# Patient Record
Sex: FEMALE | Race: ASIAN | Employment: FULL TIME | URBAN - METROPOLITAN AREA
[De-identification: names, ages, dates, MRNs, and addresses within clinical notes are randomized per-mention and may not be internally consistent; named-entity substitution may affect disease eponyms.]

---

## 2020-10-26 DIAGNOSIS — E55.9 VITAMIN D DEFICIENCY: ICD-10-CM

## 2020-10-26 DIAGNOSIS — Z00.00 PREVENTATIVE HEALTH CARE: ICD-10-CM

## 2020-10-26 DIAGNOSIS — R73.03 PREDIABETES: ICD-10-CM

## 2020-10-26 LAB
A/G RATIO: 2.3 (ref 1.1–2.2)
ALBUMIN SERPL-MCNC: 4.6 G/DL (ref 3.4–5)
ALP BLD-CCNC: 102 U/L (ref 40–129)
ALT SERPL-CCNC: 39 U/L (ref 10–40)
ANION GAP SERPL CALCULATED.3IONS-SCNC: 14 MMOL/L (ref 3–16)
AST SERPL-CCNC: 29 U/L (ref 15–37)
BASOPHILS ABSOLUTE: 0.1 K/UL (ref 0–0.2)
BASOPHILS RELATIVE PERCENT: 1 %
BILIRUB SERPL-MCNC: 0.4 MG/DL (ref 0–1)
BUN BLDV-MCNC: 9 MG/DL (ref 7–20)
CALCIUM SERPL-MCNC: 9.5 MG/DL (ref 8.3–10.6)
CHLORIDE BLD-SCNC: 104 MMOL/L (ref 99–110)
CHOLESTEROL, FASTING: 142 MG/DL (ref 0–199)
CO2: 28 MMOL/L (ref 21–32)
CREAT SERPL-MCNC: 0.6 MG/DL (ref 0.6–1.2)
EOSINOPHILS ABSOLUTE: 0.1 K/UL (ref 0–0.6)
EOSINOPHILS RELATIVE PERCENT: 2.1 %
GFR AFRICAN AMERICAN: >60
GFR NON-AFRICAN AMERICAN: >60
GLOBULIN: 2 G/DL
GLUCOSE FASTING: 129 MG/DL (ref 70–99)
HCT VFR BLD CALC: 40.2 % (ref 36–48)
HDLC SERPL-MCNC: 51 MG/DL (ref 40–60)
HEMOGLOBIN: 13.5 G/DL (ref 12–16)
LDL CHOLESTEROL CALCULATED: 46 MG/DL
LYMPHOCYTES ABSOLUTE: 1.9 K/UL (ref 1–5.1)
LYMPHOCYTES RELATIVE PERCENT: 32.5 %
MCH RBC QN AUTO: 30.1 PG (ref 26–34)
MCHC RBC AUTO-ENTMCNC: 33.6 G/DL (ref 31–36)
MCV RBC AUTO: 89.4 FL (ref 80–100)
MONOCYTES ABSOLUTE: 0.4 K/UL (ref 0–1.3)
MONOCYTES RELATIVE PERCENT: 6.7 %
NEUTROPHILS ABSOLUTE: 3.3 K/UL (ref 1.7–7.7)
NEUTROPHILS RELATIVE PERCENT: 57.7 %
PDW BLD-RTO: 13.7 % (ref 12.4–15.4)
PLATELET # BLD: 256 K/UL (ref 135–450)
PMV BLD AUTO: 9.2 FL (ref 5–10.5)
POTASSIUM SERPL-SCNC: 4 MMOL/L (ref 3.5–5.1)
RBC # BLD: 4.49 M/UL (ref 4–5.2)
SODIUM BLD-SCNC: 146 MMOL/L (ref 136–145)
T4 FREE: 1.1 NG/DL (ref 0.9–1.8)
TOTAL PROTEIN: 6.6 G/DL (ref 6.4–8.2)
TRIGLYCERIDE, FASTING: 226 MG/DL (ref 0–150)
TSH SERPL DL<=0.05 MIU/L-ACNC: 1.93 UIU/ML (ref 0.27–4.2)
VITAMIN D 25-HYDROXY: 34 NG/ML
VLDLC SERPL CALC-MCNC: 45 MG/DL
WBC # BLD: 5.7 K/UL (ref 4–11)

## 2020-10-27 LAB
ESTIMATED AVERAGE GLUCOSE: 131.2 MG/DL
HBA1C MFR BLD: 6.2 %

## 2022-02-23 PROBLEM — M51.369 DDD (DEGENERATIVE DISC DISEASE), LUMBAR: Status: ACTIVE | Noted: 2019-11-03

## 2023-11-01 ENCOUNTER — APPOINTMENT (OUTPATIENT)
Dept: PHYSICAL THERAPY | Age: 63
End: 2023-11-01
Payer: COMMERCIAL

## 2023-11-03 ENCOUNTER — TELEPHONE (OUTPATIENT)
Dept: ORTHOPEDIC SURGERY | Age: 63
End: 2023-11-03

## 2023-11-03 NOTE — TELEPHONE ENCOUNTER
Auth: NPR  Date: 11/14/23  Type of SX: OUTPATIENT  Location: [facility]  CPT: 35348   DX: M16.11  SX area: RIGHT HIP  Insurance: BC BS/BSMH

## 2023-11-13 ENCOUNTER — APPOINTMENT (OUTPATIENT)
Dept: PHYSICAL THERAPY | Age: 63
End: 2023-11-13
Payer: COMMERCIAL

## 2023-11-15 ENCOUNTER — APPOINTMENT (OUTPATIENT)
Dept: PHYSICAL THERAPY | Age: 63
End: 2023-11-15
Payer: COMMERCIAL

## 2023-11-17 ENCOUNTER — TELEPHONE (OUTPATIENT)
Dept: ORTHOPEDIC SURGERY | Age: 63
End: 2023-11-17

## 2023-11-17 DIAGNOSIS — M16.11 OSTEOARTHRITIS OF RIGHT HIP, UNSPECIFIED OSTEOARTHRITIS TYPE: ICD-10-CM

## 2023-11-17 RX ORDER — OXYCODONE HYDROCHLORIDE 5 MG/1
5-10 TABLET ORAL EVERY 6 HOURS PRN
Qty: 80 TABLET | Refills: 0 | Status: SHIPPED | OUTPATIENT
Start: 2023-11-17 | End: 2023-12-04 | Stop reason: SDUPTHER

## 2023-11-17 NOTE — TELEPHONE ENCOUNTER
Prescription Refill     Medication Name:  OXYCODONE  REQUESTING A CALL BACK  Pharmacy: [facility]  Patient Contact Number:  [phone]

## 2023-11-27 ENCOUNTER — APPOINTMENT (OUTPATIENT)
Dept: PHYSICAL THERAPY | Age: 63
End: 2023-11-27
Attending: ORTHOPAEDIC SURGERY
Payer: COMMERCIAL

## 2023-11-27 ENCOUNTER — OFFICE VISIT (OUTPATIENT)
Dept: ORTHOPEDIC SURGERY | Age: 63
End: 2023-11-27

## 2023-11-27 VITALS — BODY MASS INDEX: 31.32 KG/M2 | HEIGHT: 65 IN | WEIGHT: 188 LBS | RESPIRATION RATE: 16 BRPM

## 2023-11-27 DIAGNOSIS — Z96.641 STATUS POST TOTAL REPLACEMENT OF RIGHT HIP: Primary | ICD-10-CM

## 2023-11-27 PROCEDURE — 99024 POSTOP FOLLOW-UP VISIT: CPT | Performed by: ORTHOPAEDIC SURGERY

## 2023-11-27 NOTE — PROGRESS NOTES
[address] [facility]  Office Visit    Chief Complaint: Follow-up s/p right total hip arthroplasty    HPI:  James Lamar is a 61 y.o. who is here in follow-up of right total hip arthroplasty performed on November 14, 2023. She is walking with a walker and active in home physical therapy. She is taking oxycodone every 6 hours to help with pain. She rates pain as up to 5/10. She has pain down her lateral thigh with occasional sharp pains in this area. Patient Active Problem List   Diagnosis    Perimenopausal    Impaired fasting glucose    Anxiety    History of total hip arthroplasty, left    OA (osteoarthritis) of hip    Essential hypertension    Mixed hyperlipidemia    Arthritis of right knee    Obesity (BMI 30-39. 9)    Anxiety with depression    Trigger finger of left thumb    Acute right-sided low back pain with sciatica    Facet arthritis of lumbosacral region    Spinal stenosis of lumbar region with neurogenic claudication    Primary insomnia    Strain of right shoulder    Calculus of gallbladder with chronic cholecystitis without obstruction    DDD (degenerative disc disease), lumbar    Osteoarthritis of right hip, unspecified osteoarthritis type       ROS:  Constitutional: denies fever, chills, weight loss  MSK: denies pain in other joints, muscle aches  Neurological: denies numbness, tingling, weakness    Exam:  Appearance: sitting in exam room chair, appears to be in no acute distress, awake and alert  Resp: unlabored breathing on room air  Skin: warm, dry and intact with out erythema or significant increased temperature  Neuro: grossly intact both lower extremities. Intact sensation to light touch. Motor exam 4+ to 5/5 in all major motor groups. Right hip: Incision is healing as expected. Examination demonstrates negative logroll and negative Stinchfield. There is brisk capillary refill. She demonstrates weak active hip flexion.     Imaging:  3 views of the right hip were

## 2023-11-29 ENCOUNTER — APPOINTMENT (OUTPATIENT)
Dept: PHYSICAL THERAPY | Age: 63
End: 2023-11-29
Attending: ORTHOPAEDIC SURGERY
Payer: COMMERCIAL

## 2023-11-30 ENCOUNTER — CARE COORDINATION (OUTPATIENT)
Dept: OTHER | Facility: CLINIC | Age: 63
End: 2023-11-30

## 2023-11-30 NOTE — CARE COORDINATION
Care Transitions Follow Up Call    [facility] attempted to reach patient for Care Transitions follow up call. HIPAA compliant message left requesting a return phone call at patient convenience.      Plan for follow-up call in 10-14 days    Future Appointments   Date Time Provider [address]   12/5/2023 12:40 PM Lucho American, [place] [facility] OP PT [facility]   12/7/2023 10:40 AM Lucho American, PT [facility] OP PT [facility]   12/12/2023  9:20 AM Lucho American, PT [facility] OP PT [facility]   12/14/2023 10:40 AM Lucho American, PT [facility] OP PT [facility]   12/19/2023  8:40 AM Lucho American, PT [facility] OP PT [facility]   12/21/2023  8:40 AM Lucho American, PT [facility] OP PT [facility]   12/27/2023 10:00 AM Diego Baeza, PTA [facility] OP PT [facility]   12/29/2023  8:40 AM Diego Baeza, PTA [facility] OP PT [facility]   1/2/2024 10:00 AM Lucho American, PT [facility] PT [facility]   1/4/2024 10:15 AM Antonio Mcneil MD [facility]   5/16/2024  8:30 AM EDGAR Laird - CNP [facility]

## 2023-12-04 DIAGNOSIS — M16.11 OSTEOARTHRITIS OF RIGHT HIP, UNSPECIFIED OSTEOARTHRITIS TYPE: ICD-10-CM

## 2023-12-04 RX ORDER — OXYCODONE HYDROCHLORIDE 5 MG/1
5-10 TABLET ORAL EVERY 6 HOURS PRN
Qty: 80 TABLET | Refills: 0 | Status: SHIPPED | OUTPATIENT
Start: 2023-12-04 | End: 2023-12-14

## 2023-12-04 NOTE — TELEPHONE ENCOUNTER
Prescription Refill     Medication Name:  OXYCODONE  Pharmacy: [id number] [place] [address]   Patient Contact Number:  [phone]

## 2023-12-05 ENCOUNTER — HOSPITAL ENCOUNTER (OUTPATIENT)
Dept: PHYSICAL THERAPY | Age: 63
Setting detail: THERAPIES SERIES
Discharge: HOME | End: 2023-12-05
Attending: ORTHOPAEDIC SURGERY
Payer: COMMERCIAL

## 2023-12-05 DIAGNOSIS — M25.651 DECREASED RANGE OF RIGHT HIP MOVEMENT: ICD-10-CM

## 2023-12-05 DIAGNOSIS — M25.551 ACUTE PAIN OF RIGHT HIP: Primary | ICD-10-CM

## 2023-12-05 DIAGNOSIS — M62.81 DECREASED MUSCLE STRENGTH: ICD-10-CM

## 2023-12-05 PROCEDURE — 97530 THERAPEUTIC ACTIVITIES: CPT

## 2023-12-05 PROCEDURE — 97110 THERAPEUTIC EXERCISES: CPT

## 2023-12-05 PROCEDURE — 97161 PT EVAL LOW COMPLEX 20 MIN: CPT

## 2023-12-05 NOTE — FLOWSHEET NOTE
[address] [facility], [address] office: [phone] fax: [phone]      Physical Therapy: TREATMENT/PROGRESS NOTE   Patient: Earl Roblero (47 y.o. female)   Treatment Date: 2023   :  1960 MRN: [id number]   Visit #: 1   Insurance Allowable Auth Needed   30 visits []Yes    [x]No    Insurance: Payor: Kevon Carrier / Plan: Hernandez Patino [facility] / Product Type: *No Product type* /   Insurance ID: [id number] - ([address] [place] [address])  Secondary Insurance (if applicable):    Treatment Diagnosis:     ICD-10-CM    1. Acute pain of right hip  M25.551       2. Decreased muscle strength  M62.81       3. Decreased range of right hip movement  M25.651          Medical Diagnosis:    Status post total replacement of right hip [A11.982]   Referring Physician: Laurel Chan MD  PCP: Jyotsna Shelley MD                             Plan of care signed (Y/N):     Date of Patient follow up with Physician:      Progress Report/POC: EVAL today  POC update due: (10 visits /OR [address], whichever is less)  2024     Right KASANDRA on 23- Anterior Approach. Left KASANDRA. OA. HTN. Anxiety.       Preferred Language for Healthcare:   [x]English       []other:    SUBJECTIVE EXAMINATION     Patient Report/Comments: see eval     Test used Initial score  2023   Pain Summary VAS 5    Functional questionnaire WOMAC 64    Other:                OBJECTIVE EXAMINATION     Observation:     Test measurements: see eval    Exercises/Interventions:     Therapeutic Ex (11596)  resistance Sets/time Reps Notes/Cues/Progressions                                                                         Manual Intervention (29592)  TIME     STM Right hip musculature- Anterior and Lateral                                   NMR re-education (13601) resistance Sets/time Reps CUES NEEDED                                      Therapeutic Activity (65560)

## 2023-12-07 ENCOUNTER — HOSPITAL ENCOUNTER (OUTPATIENT)
Dept: PHYSICAL THERAPY | Age: 63
Setting detail: THERAPIES SERIES
Discharge: HOME | End: 2023-12-07
Attending: ORTHOPAEDIC SURGERY
Payer: COMMERCIAL

## 2023-12-07 PROCEDURE — 97110 THERAPEUTIC EXERCISES: CPT

## 2023-12-07 PROCEDURE — 97140 MANUAL THERAPY 1/> REGIONS: CPT

## 2023-12-07 NOTE — FLOWSHEET NOTE
[address] [facility], [address] office: [phone] fax: [phone]      Physical Therapy: TREATMENT/PROGRESS NOTE   Patient: Isabel Benavides (11 y.o. female)   Treatment Date: 2023   :  1960 MRN: [id number]   Visit #: 6   Insurance Allowable Auth Needed   30 visits []Yes    [x]No    Insurance: Payor: Arnold Cuevas / Plan: [employer] [facility] / Product Type: *No Product type* /   Insurance ID: [id number] - ([address] [place] [address])  Secondary Insurance (if applicable):    Treatment Diagnosis:   No diagnosis found. Medical Diagnosis:    Status post total replacement of right hip [O05.133]   Referring Physician: Anai Medeiros MD  PCP: Bisi Lopez MD                             Plan of care signed (Y/N):     Date of Patient follow up with Physician:      Progress Report/POC: EVAL today  POC update due: (10 visits /OR [address], whichever is less)  2024     Right KASANDRA on 23- Anterior Approach. Left KASANDRA. OA. HTN. Anxiety.       Preferred Language for Healthcare:   [x]English       []other:    SUBJECTIVE EXAMINATION     Patient Report/Comments:   23: States her hip is doing ok, HEP went well     Test used Initial score  2023   Pain Summary VAS 5    Functional questionnaire WOMAC 64    Other:                OBJECTIVE EXAMINATION     Observation:     Test measurements: see eval    Exercises/Interventions:     Therapeutic Ex (57650)  resistance Sets/time Reps Notes/Cues/Progressions   Nu Step Level 4 5 min     LAQ  2 20    Heel Slide  1 20    Hip Abduction Isometric  1/\" 15           Standing:  Mini Squat  Hip Abduction    2  1   10  10 B                                Manual Intervention (07703)  TIME     STM Right hip musculature- Anterior and Lateral       PROM R hip to tolerance- mindful of precautions                             NMR re-education (10491) resistance Sets/time Reps CUES

## 2023-12-10 DIAGNOSIS — I10 ESSENTIAL HYPERTENSION: ICD-10-CM

## 2023-12-11 RX ORDER — AMLODIPINE BESYLATE 10 MG/1
10 TABLET ORAL DAILY
Qty: 90 TABLET | Refills: 1 | Status: SHIPPED | OUTPATIENT
Start: 2023-12-11

## 2023-12-11 RX ORDER — LOSARTAN POTASSIUM 100 MG/1
100 TABLET ORAL DAILY
Qty: 90 TABLET | Refills: 1 | Status: SHIPPED | OUTPATIENT
Start: 2023-12-11

## 2023-12-12 ENCOUNTER — HOSPITAL ENCOUNTER (OUTPATIENT)
Dept: PHYSICAL THERAPY | Age: 63
Setting detail: THERAPIES SERIES
Discharge: HOME | End: 2023-12-12
Attending: ORTHOPAEDIC SURGERY
Payer: COMMERCIAL

## 2023-12-12 PROCEDURE — 97140 MANUAL THERAPY 1/> REGIONS: CPT

## 2023-12-12 PROCEDURE — 97110 THERAPEUTIC EXERCISES: CPT

## 2023-12-12 NOTE — FLOWSHEET NOTE
[address] [facility], [address] office: [phone] fax: [phone]      Physical Therapy: TREATMENT/PROGRESS NOTE   Patient: Danna Mccormick (44 y.o. female)   Treatment Date: 2023   :  1960 MRN: [id number]   Visit #: 7   Insurance Allowable Auth Needed   30 visits []Yes    [x]No    Insurance: Payor: Jay Monroy / [facility] Velaz[facility] / Product Type: *No Product type* /   Insurance ID: [id number] - ([address] [place] [address])  Secondary Insurance (if applicable):    Treatment Diagnosis:   No diagnosis found. Medical Diagnosis:    Status post total replacement of right hip [V06.635]   Referring Physician: Selina Vazquez MD  PCP: Michelle Reddy MD                             Plan of care signed (Y/N):     Date of Patient follow up with Physician:      Progress Report/POC: EVAL today  POC update due: (10 visits /OR [address], whichever is less)  2024     Right KASANDRA on 23- Anterior Approach. Left KASANDRA. OA. HTN. Anxiety.       Preferred Language for Healthcare:   [x]English       []other:    SUBJECTIVE EXAMINATION     Patient Report/Comments:   23: States her hip is doing ok, HEP went well  23: Doing better, able to get her leg up into bed without      Test used Initial score  2023   Pain Summary VAS 5 4   Functional questionnaire WOMAC 64    Other:                OBJECTIVE EXAMINATION     Observation:     Test measurements: see eval    Exercises/Interventions:     Therapeutic Ex (75573)  resistance Sets/time Reps Notes/Cues/Progressions   Nu Step Level 4 5 min     LAQ  2 20    Heel Slide  1 20    Hip Abduction Isometric  /\" 15    Quad and Gluteal Set  10 sec 5    Standing:  Mini Squat  Hip Abduction    2  2   10  10 B                                Manual Intervention (47353)  TIME     STM Right hip musculature- Anterior and Lateral       PROM R hip to tolerance-

## 2023-12-14 ENCOUNTER — HOSPITAL ENCOUNTER (OUTPATIENT)
Dept: PHYSICAL THERAPY | Age: 63
Setting detail: THERAPIES SERIES
Discharge: HOME | End: 2023-12-14
Attending: ORTHOPAEDIC SURGERY
Payer: COMMERCIAL

## 2023-12-14 PROCEDURE — 97140 MANUAL THERAPY 1/> REGIONS: CPT

## 2023-12-14 PROCEDURE — 97110 THERAPEUTIC EXERCISES: CPT

## 2023-12-14 NOTE — FLOWSHEET NOTE
posture, and/or proprioception for sitting and/or standing activities    (09629) THERAPEUTIC ACTIVITY - use of dynamic activities to improve functional performance. (Ex include squatting, ascending/descending stairs, walking, bending, lifting, catching, throwing, pushing, pulling, jumping.)  Direct, one on one contact, billed in 15-minute increments. (11662) MANUAL THERAPY -  Manual therapy techniques, 1 or more regions, each 15 minutes (Mobilization/manipulation, manual lymphatic drainage, manual traction) for the purpose of modulating pain, promoting relaxation,  increasing ROM, reducing/eliminating soft tissue swelling/inflammation/restriction, improving soft tissue extensibility and allowing for proper ROM for normal function with self care, mobility, lifting and ambulation    TREATMENT PLAN   Plan: POC Initiated today- see eval for details    Electronically Signed by Stacie Mohr PT              Date: 12/14/2023   Stacie Mohr PT, DPT  Board-Certified Orthopaedic Clinical Specialist  Orthopaedic Manual Therapist - Certified    Note: If patient does not return for scheduled/recommended follow up visits, this note will serve as a discharge from care along with the most recent update on progress.

## 2023-12-15 ENCOUNTER — CARE COORDINATION (OUTPATIENT)
Dept: OTHER | Facility: CLINIC | Age: 63
End: 2023-12-15

## 2023-12-15 NOTE — CARE COORDINATION
Care Transitions Follow Up Call    Patient Current Location:  Home: [address]    Care Transition Nurse contacted the patient by telephone to follow up after admission on 23. Verified name and  with patient as identifiers. Patient: Komal Huertas  Patient : 1960   MRN: [id number]  Reason for Admission: Osteoarthritis of right hip  Discharge Date: 11/15/23 RARS: Readmission Risk Score: 4.9      Needs to be reviewed by the provider   Additional needs identified to be addressed with provider: No  none             Method of communication with provider: none. Patient c/o pain when walking far. Patient continues to take Roxicodone 5 mg po 1-2 times daily with positive results. Patient states alternates with Tylenol 500 mg po bid. Patient instructed to refrain from exceeding 4000 mg of acetaminophen in 24 hour period. Patient resumed Glucosamine three weeks post op. Patient states last dose of Aspirin 81 mg po will be taken tonight. Patient is taking Senokot hs; last bowel movement on 12/15/23. Patient reports progressing well, but slowly. Patient continues to follow physicians restrictions. Patient is ambulating with a cane during the day and a walker if needs to use the bathroom in the middle of the night for safety. Patient denies falls. Home care discharged patient. Patient now engaged with outpatient PT at [facility] since 23. Patient reports performing home exercises. Patient states proximal incision opened 1 week ago; scant serosanguinous drainage x 3-4 days on bandaid. Patient states applied Bactroban. Area is now drying out. Patient denies s/s of infection. Patient instructed on s/s of infection to report including but not limited to fever, chills, foul smelling drainage, increased warmth, redness or hardness around incision. Patient instructed to wash incision gently and pat thoroughly dry.   Patient instructed to avoid

## 2023-12-27 ENCOUNTER — HOSPITAL ENCOUNTER (OUTPATIENT)
Dept: PHYSICAL THERAPY | Age: 63
Setting detail: THERAPIES SERIES
Discharge: HOME | End: 2023-12-27
Attending: ORTHOPAEDIC SURGERY
Payer: COMMERCIAL

## 2023-12-27 PROCEDURE — 97140 MANUAL THERAPY 1/> REGIONS: CPT

## 2023-12-27 PROCEDURE — 97110 THERAPEUTIC EXERCISES: CPT

## 2023-12-27 NOTE — FLOWSHEET NOTE
flexibility, following either an injury or surgery. (89055) NEUROMUSCULAR RE-EDUCATION - Therapeutic procedure, 1 or more areas, each 15 minutes; neuromuscular reeducation of movement, balance, coordination, kinesthetic sense, posture, and/or proprioception for sitting and/or standing activities  (58149) [facility] - Reviewed/Progressed HEP activities related to neuromuscular reeducation of movement, balance, coordination, kinesthetic sense, posture, and/or proprioception for sitting and/or standing activities    (02020) THERAPEUTIC ACTIVITY - use of dynamic activities to improve functional performance. (Ex include squatting, ascending/descending stairs, walking, bending, lifting, catching, throwing, pushing, pulling, jumping.)  Direct, one on one contact, billed in 15-minute increments. (06854) MANUAL THERAPY -  Manual therapy techniques, 1 or more regions, each 15 minutes (Mobilization/manipulation, manual lymphatic drainage, manual traction) for the purpose of modulating pain, promoting relaxation,  increasing ROM, reducing/eliminating soft tissue swelling/inflammation/restriction, improving soft tissue extensibility and allowing for proper ROM for normal function with self care, mobility, lifting and ambulation    TREATMENT PLAN   Plan: POC Initiated today- see eval for details    Electronically Signed by Marty Babb, PTA    [address]          Date: 12/27/2023   Lai Madrid PT, DPT  Board-Certified Orthopaedic Clinical Specialist  Orthopaedic Manual Therapist - Certified    Note: If patient does not return for scheduled/recommended follow up visits, this note will serve as a discharge from care along with the most recent update on progress.

## 2023-12-29 ENCOUNTER — HOSPITAL ENCOUNTER (OUTPATIENT)
Dept: PHYSICAL THERAPY | Age: 63
Setting detail: THERAPIES SERIES
Discharge: HOME | End: 2023-12-29
Attending: ORTHOPAEDIC SURGERY
Payer: COMMERCIAL

## 2023-12-29 PROCEDURE — 97110 THERAPEUTIC EXERCISES: CPT

## 2023-12-29 PROCEDURE — 97140 MANUAL THERAPY 1/> REGIONS: CPT

## 2023-12-29 NOTE — FLOWSHEET NOTE
OBJECTIVE EXAMINATION     Observation:     Test measurements: see eval    Exercises/Interventions:     Therapeutic Ex (36061)  resistance Sets/time Reps Notes/Cues/Progressions   Nu Step Level 5 5 min     LAQ 5# 2 20    Heel Slide  SLR- Flexion            Abduction  Clamshell  1  2  1  3 20  10  10  10   W/ PT assist  W/ PT assist   Hip Abduction Isometric  Hip Adduction Isometric  1/5\" 15 Done manually   Quad and Gluteal Set  10 sec 5    Standing:  Mini Squat  Hip Abduction  Step- 6 Inch  Fwd    2  2    3   10  10 B    10                                Manual Intervention (94903)  TIME     STM Right hip musculature- Anterior and Lateral       PROM R hip to tolerance- mindful of precautions                             NMR re-education (91492) resistance Sets/time Reps CUES NEEDED                                      Therapeutic Activity (04280)  Sets/time            HEP       Reviewed current HEP- Quad and Gluteal set, Heel Slide, Hip abd in supine, standing mini squat,HR, knee flexion. Added LAQ, Hip abduction isometric, and Standing hip abduction    12/5/23                   Modalities:    CP x 10 min to R Hip    Education/Home Exercise Program: HEP discussed and performed, see exercise grid      ASSESSMENT     Today's Assessment:   Tolerated tx well. Improving hip PROM,still needs some assistance with SLR. Less fatigue noted today. Improved Womac score. Cont to progress per tolerance. Medical Necessity Documentation:  I certify that this patient meets the below criteria necessary for medical necessity for care and/or justification of therapy services:   The patient has functional impairments and/or activity limitations and would benefit from continued outpatient therapy services to address the deficits outlined in the patients goals    Treatment/Activity Tolerance:  [x] Patient tolerated treatment well [] Patient limited by fatique  [] Patient limited by pain  [] Patient limited by other medical

## 2024-01-02 ENCOUNTER — APPOINTMENT (OUTPATIENT)
Dept: PHYSICAL THERAPY | Age: 64
End: 2024-01-02
Attending: ORTHOPAEDIC SURGERY
Payer: COMMERCIAL

## 2024-01-04 ENCOUNTER — OFFICE VISIT (OUTPATIENT)
Dept: ORTHOPEDIC SURGERY | Age: 64
End: 2024-01-04

## 2024-01-04 VITALS — WEIGHT: 195 LBS | HEIGHT: 64 IN | BODY MASS INDEX: 33.29 KG/M2

## 2024-01-04 DIAGNOSIS — Z96.641 STATUS POST TOTAL REPLACEMENT OF RIGHT HIP: Primary | ICD-10-CM

## 2024-01-04 PROCEDURE — 99024 POSTOP FOLLOW-UP VISIT: CPT | Performed by: PHYSICIAN ASSISTANT

## 2024-01-05 NOTE — PROGRESS NOTES
This dictation was done with [employer] dictation and may contain mechanical errors related to translation.  Height 1.626 m (5' 4\"), weight 88.5 kg (195 lb), last menstrual period 01/01/2012.         this is a pleasant 63-year-old female who is here status post right total hip replacement from 11/14/2023.  Her incisions healing nicely she still has some weakness to hip abduction she rates it a 3 out of 10 pain.    She has been doing home physical therapy at this point we will going to write for outpatient physical therapy.  She continue per the protocol she is not ready to return to work without restrictions.  I estimate by the 29th she will be able to return and she was given a note to that extent.    On examination again we have a well-healed incision and fair hip flexion abduction strength she walks with a slight limp has no neurological deficits distally.    My impression is status post healing right total hip replacement.    Will see her in follow-up in 6 months to a year

## 2024-01-09 ENCOUNTER — HOSPITAL ENCOUNTER (OUTPATIENT)
Dept: PHYSICAL THERAPY | Age: 64
Setting detail: THERAPIES SERIES
Discharge: HOME | End: 2024-01-09
Attending: ORTHOPAEDIC SURGERY
Payer: COMMERCIAL

## 2024-01-09 PROCEDURE — 97140 MANUAL THERAPY 1/> REGIONS: CPT

## 2024-01-09 PROCEDURE — 97110 THERAPEUTIC EXERCISES: CPT

## 2024-01-09 NOTE — FLOWSHEET NOTE
[facility]- [facility] [address], [place] [address] office: [phone] fax: [phone]      Physical Therapy: TREATMENT/PROGRESS NOTE   Patient: Tom Avalos (63 y.o. female)   Treatment Date: 2024   :  1960 MRN: [id number]   Visit #: 9  Insurance Allowable Auth Needed   30 visits []Yes    [x]No    Insurance: Payor: DARLYN / Plan: ADDISON [employer] [place] / Product Type: *No Product type* /   Insurance ID: No Subscriber Number on File  Secondary Insurance (if applicable):    Treatment Diagnosis:   No diagnosis found.     Medical Diagnosis:    Status post total replacement of right hip [Z96.641]   Referring Physician: Juve Wang MD  PCP: Valeriy Cameron MD                             Plan of care signed (Y/N):     Date of Patient follow up with Physician:      Progress Report/POC: EVAL today  POC update due: (10 visits /OR AUTH LIMITS, whichever is less)  2024     Right KASANDRA on 23- Anterior Approach. - 5+ weeks  Left KASANDRA. OA. HTN. Anxiety.      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 States her back bothered her some.  23: Patient reports hip is improving, still gets fatigued at the end of the day.States she had MRI on her low back today.  23:  Patient reports she drove for the first time since the surgery.States hip is feeling better,just gets tired quickly still.   24: States she is doing pretty good, walking without the cane. States was hurting a little after driving for 30 min, down her leg. Still has trouble getting her socks on if they are not ankle socks     Test used Initial score  2024   Pain Summary VAS 5 2   Functional questionnaire WOMAC 64 35   Other:                OBJECTIVE EXAMINATION  SBAR bedside report given to Cam Morris RN. Pt care relinquished.

## 2024-01-12 ENCOUNTER — HOSPITAL ENCOUNTER (OUTPATIENT)
Dept: PHYSICAL THERAPY | Age: 64
Setting detail: THERAPIES SERIES
Discharge: HOME | End: 2024-01-12
Attending: ORTHOPAEDIC SURGERY
Payer: COMMERCIAL

## 2024-01-12 PROCEDURE — 97110 THERAPEUTIC EXERCISES: CPT

## 2024-01-12 PROCEDURE — 97530 THERAPEUTIC ACTIVITIES: CPT

## 2024-01-12 PROCEDURE — 97140 MANUAL THERAPY 1/> REGIONS: CPT

## 2024-01-12 NOTE — FLOWSHEET NOTE
proper ROM for normal function with self care, mobility, lifting and ambulation    TREATMENT PLAN   Plan: POC Initiated today- see eval for details    Electronically Signed by Dick Ray PT        Date: 01/12/2024   Dick Ray PT, DPT  Board-Certified Orthopaedic Clinical Specialist  Orthopaedic Manual Therapist - Certified    Note: If patient does not return for scheduled/recommended follow up visits, this note will serve as a discharge from care along with the most recent update on progress.

## 2024-01-16 ENCOUNTER — HOSPITAL ENCOUNTER (OUTPATIENT)
Dept: PHYSICAL THERAPY | Age: 64
Setting detail: THERAPIES SERIES
Discharge: HOME | End: 2024-01-16
Attending: ORTHOPAEDIC SURGERY
Payer: COMMERCIAL

## 2024-01-16 PROCEDURE — 97110 THERAPEUTIC EXERCISES: CPT

## 2024-01-16 PROCEDURE — 97140 MANUAL THERAPY 1/> REGIONS: CPT

## 2024-01-16 NOTE — FLOWSHEET NOTE
endurance, ROM performed to prevent loss of range of motion, maintain or improve muscular strength or increase flexibility, following either an injury or surgery.   (13172) HOME EXERCISE PROGRAM - Reviewed/Progressed HEP activities related to strengthening, flexibility, endurance, ROM performed to prevent loss of range of motion, maintain or improve muscular strength or increase flexibility, following either an injury or surgery.  (50913) NEUROMUSCULAR RE-EDUCATION - Therapeutic procedure, 1 or more areas, each 15 minutes; neuromuscular reeducation of movement, balance, coordination, kinesthetic sense, posture, and/or proprioception for sitting and/or standing activities  (81001) HOME EXERCISE PROGRAM - Reviewed/Progressed HEP activities related to neuromuscular reeducation of movement, balance, coordination, kinesthetic sense, posture, and/or proprioception for sitting and/or standing activities    (11888) THERAPEUTIC ACTIVITY - use of dynamic activities to improve functional performance. (Ex include squatting, ascending/descending stairs, walking, bending, lifting, catching, throwing, pushing, pulling, jumping.)  Direct, one on one contact, billed in 15-minute increments.  (84880) MANUAL THERAPY -  Manual therapy techniques, 1 or more regions, each 15 minutes (Mobilization/manipulation, manual lymphatic drainage, manual traction) for the purpose of modulating pain, promoting relaxation,  increasing ROM, reducing/eliminating soft tissue swelling/inflammation/restriction, improving soft tissue extensibility and allowing for proper ROM for normal function with self care, mobility, lifting and ambulation    TREATMENT PLAN   Plan: POC Initiated today- see eval for details    Electronically Signed by Dick Ray PT        Date: 01/16/2024   Dick Ray PT, DPT  Board-Certified Orthopaedic Clinical Specialist  Orthopaedic Manual Therapist - Certified    Note: If patient does not return for scheduled/recommended follow up

## 2024-01-17 NOTE — FLOWSHEET NOTE
flexibility, endurance, ROM performed to prevent loss of range of motion, maintain or improve muscular strength or increase flexibility, following either an injury or surgery.   (89140) HOME EXERCISE PROGRAM - Reviewed/Progressed HEP activities related to strengthening, flexibility, endurance, ROM performed to prevent loss of range of motion, maintain or improve muscular strength or increase flexibility, following either an injury or surgery.  (15880) NEUROMUSCULAR RE-EDUCATION - Therapeutic procedure, 1 or more areas, each 15 minutes; neuromuscular reeducation of movement, balance, coordination, kinesthetic sense, posture, and/or proprioception for sitting and/or standing activities  (15905) HOME EXERCISE PROGRAM - Reviewed/Progressed HEP activities related to neuromuscular reeducation of movement, balance, coordination, kinesthetic sense, posture, and/or proprioception for sitting and/or standing activities    (32349) THERAPEUTIC ACTIVITY - use of dynamic activities to improve functional performance. (Ex include squatting, ascending/descending stairs, walking, bending, lifting, catching, throwing, pushing, pulling, jumping.)  Direct, one on one contact, billed in 15-minute increments.  (75219) MANUAL THERAPY -  Manual therapy techniques, 1 or more regions, each 15 minutes (Mobilization/manipulation, manual lymphatic drainage, manual traction) for the purpose of modulating pain, promoting relaxation,  increasing ROM, reducing/eliminating soft tissue swelling/inflammation/restriction, improving soft tissue extensibility and allowing for proper ROM for normal function with self care, mobility, lifting and ambulation    TREATMENT PLAN   Plan: POC Initiated today- see eval for details    Electronically Signed by Dick Ray PT        Date: 01/18/2024   Dick Ray PT, DPT  Board-Certified Orthopaedic Clinical Specialist  Orthopaedic Manual Therapist - Certified    Note: If patient does not return for

## 2024-01-18 ENCOUNTER — HOSPITAL ENCOUNTER (OUTPATIENT)
Dept: PHYSICAL THERAPY | Age: 64
Setting detail: THERAPIES SERIES
Discharge: HOME | End: 2024-01-18
Attending: ORTHOPAEDIC SURGERY
Payer: COMMERCIAL

## 2024-01-18 PROCEDURE — 97110 THERAPEUTIC EXERCISES: CPT

## 2024-01-23 ENCOUNTER — APPOINTMENT (OUTPATIENT)
Dept: PHYSICAL THERAPY | Age: 64
End: 2024-01-23
Attending: ORTHOPAEDIC SURGERY
Payer: COMMERCIAL

## 2024-01-25 ENCOUNTER — APPOINTMENT (OUTPATIENT)
Dept: PHYSICAL THERAPY | Age: 64
End: 2024-01-25
Attending: ORTHOPAEDIC SURGERY
Payer: COMMERCIAL

## 2024-01-30 RX ORDER — METOPROLOL SUCCINATE 50 MG/1
50 TABLET, EXTENDED RELEASE ORAL DAILY
Qty: 90 TABLET | Refills: 1 | Status: SHIPPED | OUTPATIENT
Start: 2024-01-30

## 2024-01-31 ENCOUNTER — APPOINTMENT (OUTPATIENT)
Dept: PHYSICAL THERAPY | Age: 64
End: 2024-01-31
Attending: ORTHOPAEDIC SURGERY
Payer: COMMERCIAL

## 2024-02-12 ENCOUNTER — OFFICE VISIT (OUTPATIENT)
Dept: ORTHOPEDIC SURGERY | Age: 64
End: 2024-02-12

## 2024-02-12 VITALS — WEIGHT: 195 LBS | HEIGHT: 64 IN | BODY MASS INDEX: 33.29 KG/M2

## 2024-02-12 DIAGNOSIS — Z96.641 STATUS POST TOTAL REPLACEMENT OF RIGHT HIP: Primary | ICD-10-CM

## 2024-02-12 PROCEDURE — 99024 POSTOP FOLLOW-UP VISIT: CPT | Performed by: ORTHOPAEDIC SURGERY

## 2024-02-12 NOTE — PROGRESS NOTES
[facility]  Office Visit    Chief Complaint: Follow-up s/p right total hip arthroplasty    HPI:  Tom Avalos is a 63 y.o. who is here in follow-up of right total hip arthroplasty performed on November 14, 2023.  She walks without assistive device.  She has no pain in the right hip.  She has been back to work for 3 weeks.  She has no issues with her right hip.  She does have a history of left total hip arthroplasty.  She continues to have trouble with left hip flexion and occasional pain in the left thigh.    Patient Active Problem List   Diagnosis    Perimenopausal    Impaired fasting glucose    Anxiety    History of total hip arthroplasty, left    OA (osteoarthritis) of hip    Essential hypertension    Mixed hyperlipidemia    Arthritis of right knee    Obesity (BMI 30-39.9)    Anxiety with depression    Trigger finger of left thumb    Acute right-sided low back pain with sciatica    Facet arthritis of lumbosacral region    Spinal stenosis of lumbar region with neurogenic claudication    Primary insomnia    Strain of right shoulder    Calculus of gallbladder with chronic cholecystitis without obstruction    DDD (degenerative disc disease), lumbar    Osteoarthritis of right hip, unspecified osteoarthritis type       ROS:  Constitutional: denies fever, chills, weight loss  MSK: denies pain in other joints, muscle aches  Neurological: denies numbness, tingling, weakness    Exam:  Appearance: sitting in exam room chair, appears to be in no acute distress, awake and alert  Resp: unlabored breathing on room air  Skin: warm, dry and intact with out erythema or significant increased temperature  Neuro: grossly intact both lower extremities. Intact sensation to light touch. Motor exam 4+ to 5/5 in all major motor groups.  Right hip: Examination demonstrates negative logroll and negative Stinchfield.  There is brisk capillary refill.  She demonstrates weak active hip

## 2024-02-16 ENCOUNTER — HOSPITAL ENCOUNTER (OUTPATIENT)
Dept: WOMENS IMAGING | Age: 64
Discharge: HOME | End: 2024-02-16
Payer: COMMERCIAL

## 2024-02-16 DIAGNOSIS — Z12.31 ENCOUNTER FOR SCREENING MAMMOGRAM FOR BREAST CANCER: ICD-10-CM

## 2024-02-16 PROCEDURE — 77063 BREAST TOMOSYNTHESIS BI: CPT

## 2024-02-19 RX ORDER — OMEPRAZOLE 40 MG/1
40 CAPSULE, DELAYED RELEASE ORAL DAILY
Qty: 90 CAPSULE | Refills: 1 | Status: SHIPPED | OUTPATIENT
Start: 2024-02-19

## 2024-02-19 RX ORDER — ATORVASTATIN CALCIUM 40 MG/1
40 TABLET, FILM COATED ORAL DAILY
Qty: 90 TABLET | Refills: 1 | Status: SHIPPED | OUTPATIENT
Start: 2024-02-19

## 2024-03-01 ENCOUNTER — PATIENT MESSAGE (OUTPATIENT)
Dept: PRIMARY CARE CLINIC | Age: 64
End: 2024-03-01

## 2024-03-01 DIAGNOSIS — I10 ESSENTIAL HYPERTENSION: ICD-10-CM

## 2024-03-04 RX ORDER — LOSARTAN POTASSIUM 100 MG/1
100 TABLET ORAL DAILY
Qty: 90 TABLET | Refills: 1 | Status: SHIPPED | OUTPATIENT
Start: 2024-03-04

## 2024-03-04 RX ORDER — AMLODIPINE BESYLATE 10 MG/1
10 TABLET ORAL DAILY
Qty: 90 TABLET | Refills: 1 | Status: SHIPPED | OUTPATIENT
Start: 2024-03-04

## 2024-03-04 RX ORDER — METOPROLOL SUCCINATE 50 MG/1
50 TABLET, EXTENDED RELEASE ORAL DAILY
Qty: 90 TABLET | Refills: 1 | Status: SHIPPED | OUTPATIENT
Start: 2024-03-04

## 2024-03-04 RX ORDER — OMEPRAZOLE 40 MG/1
40 CAPSULE, DELAYED RELEASE ORAL DAILY
Qty: 90 CAPSULE | Refills: 1 | Status: SHIPPED | OUTPATIENT
Start: 2024-03-04

## 2024-03-04 RX ORDER — ATORVASTATIN CALCIUM 40 MG/1
40 TABLET, FILM COATED ORAL DAILY
Qty: 90 TABLET | Refills: 1 | Status: SHIPPED | OUTPATIENT
Start: 2024-03-04

## 2024-03-04 NOTE — TELEPHONE ENCOUNTER
From: Tom Avalos  To: Dr. Valeriy Cameron  Sent: 3/1/2024 1:02 PM EST  Subject: Medications to mail order x 90 days    May I request for a 90 day prescription for my medications be sent to:    [facility] MA[facility] ORDER  [phone]    Metoprolol  Amlodipine  Losartan  Omeprazole  Atorvastatin    Thank you!

## 2024-04-08 DIAGNOSIS — J06.9 VIRAL URI: ICD-10-CM

## 2024-04-08 DIAGNOSIS — H65.92 FLUID LEVEL BEHIND TYMPANIC MEMBRANE OF LEFT EAR: Primary | ICD-10-CM

## 2024-04-08 RX ORDER — PREDNISONE 10 MG/1
TABLET ORAL
Qty: 34 TABLET | Refills: 0 | Status: SHIPPED | OUTPATIENT
Start: 2024-04-08

## 2024-04-08 NOTE — PROGRESS NOTES
Pt seen and examined outside clinic. Left ear fullness and muffled hearing x 3 days  Left effusion on exam  Recommend prednisone burst and flonase twice daily  Risks of medications discussed.  Rx sent to pharmacy

## 2024-05-14 ENCOUNTER — TELEPHONE (OUTPATIENT)
Dept: SURGERY | Age: 64
End: 2024-05-14

## 2024-05-14 NOTE — TELEPHONE ENCOUNTER
Collected intake   
Left VM for patient to return call to review intake and confirm appointment for 5/16/24 at 8;30 AM in our [place] office. Also asked patient to arrive 15 minutes before appointment time if unavailable to return call.   
(M6) obeys commands

## 2024-05-16 ENCOUNTER — OFFICE VISIT (OUTPATIENT)
Dept: SURGERY | Age: 64
End: 2024-05-16
Payer: COMMERCIAL

## 2024-05-16 VITALS
HEIGHT: 65 IN | OXYGEN SATURATION: 98 % | BODY MASS INDEX: 33.79 KG/M2 | HEART RATE: 78 BPM | RESPIRATION RATE: 18 BRPM | WEIGHT: 202.8 LBS

## 2024-05-16 DIAGNOSIS — Z12.31 SCREENING MAMMOGRAM FOR HIGH-RISK PATIENT: Primary | ICD-10-CM

## 2024-05-16 DIAGNOSIS — Z91.89 AT HIGH RISK FOR BREAST CANCER: Primary | ICD-10-CM

## 2024-05-16 DIAGNOSIS — Z12.39 BREAST CANCER SCREENING, HIGH RISK PATIENT: ICD-10-CM

## 2024-05-16 DIAGNOSIS — Z80.3 FAMILY HISTORY OF BREAST CANCER: ICD-10-CM

## 2024-05-16 PROCEDURE — 99205 OFFICE O/P NEW HI 60 MIN: CPT | Performed by: NURSE PRACTITIONER

## 2024-05-16 NOTE — PROGRESS NOTES
[facility]   Surgical Breast Oncology     Primary Care Provider: Valeriy Cameron MD    CC: High Risk Screening for Breast Cancer      Tom Avalos is being seen at the request of Dr. Ellis for a consultation for high risk screening for breast cancer based on family history of breast cancer.    HPI:  Tom Avalos is a 64 y.o. woman here for evaluation of her risk for breast cancer.  Overall doing well and has no breast related concerns or changes in her health.  She states that she does perform routine self breast evaluations and has not noticed any new abnormalities such as masses, skin changes, color changes,nipple discharge, or changes to the nipple-areolar complex.      Family cancer history is significant for breast cancer.   She has not had a breast biopsy or breast problems in the past.        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  BI-RADS1.       Past Medical History:   Diagnosis Date    Anxiety 11/07/2014    History of vitamin D deficiency     HTN (hypertension) 11/07/2014    Hyperlipidemia 11/19/2013    Impaired fasting glucose 11/19/2013    OA (osteoarthritis) of hip 05/18/2015    s/p  left hip replacement by Dr Hoffman    Obesity 11/07/2014    Perimenopausal 11/19/2013    Prediabetes     S/P hip replacement 06/02/2015       Past Surgical History:   Procedure Laterality Date    CHOLECYSTECTOMY      COLONOSCOPY      COLONOSCOPY N/A 8/4/2023    COLORECTAL CANCER SCREENING, NOT HIGH RISK performed by Gonsalo Morrison MD at [facility] M[facility] ENDOSCOPY    HIP ARTHROPLASTY Left 05/18/2015    Left anterior hip replacement    JOINT REPLACEMENT Left     THR    TOTAL HIP ARTHROPLASTY Right 11/14/2023    RIGHT ANTERIOR TOTAL HIP REPLACEMENT WITH C-ARM performed by Juve Wang MD at [facility] OR

## 2024-05-30 RX ORDER — AMLODIPINE BESYLATE 10 MG/1
10 TABLET ORAL DAILY
Qty: 90 TABLET | Refills: 1 | Status: SHIPPED | OUTPATIENT
Start: 2024-05-30

## 2024-09-09 ENCOUNTER — PATIENT MESSAGE (OUTPATIENT)
Dept: PRIMARY CARE CLINIC | Age: 64
End: 2024-09-09

## 2024-09-09 DIAGNOSIS — I10 ESSENTIAL HYPERTENSION: ICD-10-CM

## 2024-09-09 RX ORDER — LOSARTAN POTASSIUM 100 MG/1
100 TABLET ORAL DAILY
Qty: 90 TABLET | Refills: 1 | Status: CANCELLED | OUTPATIENT
Start: 2024-09-09

## 2024-09-09 RX ORDER — LOSARTAN POTASSIUM 100 MG/1
100 TABLET ORAL DAILY
Qty: 90 TABLET | Refills: 0 | Status: SHIPPED | OUTPATIENT
Start: 2024-09-09

## 2024-09-09 RX ORDER — METOPROLOL SUCCINATE 50 MG/1
50 TABLET, EXTENDED RELEASE ORAL DAILY
Qty: 90 TABLET | Refills: 0 | Status: SHIPPED | OUTPATIENT
Start: 2024-09-09

## 2024-10-07 ENCOUNTER — TELEMEDICINE (OUTPATIENT)
Dept: PRIMARY CARE CLINIC | Age: 64
End: 2024-10-07
Payer: COMMERCIAL

## 2024-10-07 DIAGNOSIS — M65.841 STENOSING TENOSYNOVITIS OF FINGER OF RIGHT HAND: Primary | ICD-10-CM

## 2024-10-07 DIAGNOSIS — M65.311 TRIGGER FINGER OF RIGHT THUMB: ICD-10-CM

## 2024-10-07 PROCEDURE — 99214 OFFICE O/P EST MOD 30 MIN: CPT | Performed by: FAMILY MEDICINE

## 2024-10-07 RX ORDER — NAPROXEN 500 MG/1
500 TABLET ORAL 2 TIMES DAILY WITH MEALS
Qty: 60 TABLET | Refills: 0 | Status: SHIPPED | OUTPATIENT
Start: 2024-10-07

## 2024-10-07 RX ORDER — PREDNISONE 20 MG/1
20 TABLET ORAL 2 TIMES DAILY
Qty: 10 TABLET | Refills: 0 | Status: SHIPPED | OUTPATIENT
Start: 2024-10-07 | End: 2024-10-12

## 2024-10-07 ASSESSMENT — ENCOUNTER SYMPTOMS
DIARRHEA: 0
CONSTIPATION: 0
BLOOD IN STOOL: 0
SHORTNESS OF BREATH: 0
ABDOMINAL PAIN: 0

## 2024-10-07 NOTE — PROGRESS NOTES
NSAIDs and pulse dose steroids.  If not better in 2 to 4 weeks will refer back to the hand surgeon Dr. Deejay Asencio who saw the patient in 2021 for possible repeat steroids injection  - naproxen (NAPROSYN) 500 MG tablet; Take 1 tablet by mouth 2 times daily (with meals) Take with food.  Dispense: 60 tablet; Refill: 0  - predniSONE (DELTASONE) 20 MG tablet; Take 1 tablet by mouth 2 times daily for 5 days  Dispense: 10 tablet; Refill: 0      RTC in 2-4 weeks    Tom Avalos, was evaluated through a synchronous (real-time) audio-video encounter. The patient (or guardian if applicable) is aware that this is a billable service, which includes applicable co-pays. This Virtual Visit was conducted with patient's (and/or legal guardian's) consent. Patient identification was verified, and a caregiver was present when appropriate.   The patient was located at Home: [address] [place] [address]  Provider was located at Facility (Appt Dept): [address] [place] [address]  Confirm you are appropriately licensed, registered, or certified to deliver care in the state where the patient is located as indicated above. If you are not or unsure, please re-schedule the visit: Yes, I confirm.       Total time spent on this encounter: Not billed by time    --CONOR BUCKNER MD on 10/7/2024 at 9:39 AM    An electronic signature was used to authenticate this note.

## 2024-11-05 ENCOUNTER — TELEPHONE (OUTPATIENT)
Dept: PRIMARY CARE CLINIC | Age: 64
End: 2024-11-05

## 2024-11-05 NOTE — TELEPHONE ENCOUNTER
----- Message from Morenita ORELLANA sent at 11/5/2024  8:19 AM EST -----  Regarding: E[facility] Appointment Request  E[facility] Appointment Request    Patient needs appointment for E[facility] Appointment Type: Annual Visit.    Patient Requested Dates(s):  November 08, 2024  Patient Requested Time: Mid Morning. Around 11:00 am  Provider Name: Valeriy Cameron MD     Reason for Appointment Request: Established Patient - Available appointments did not meet patient need. Patient have an appointment on 11/08/2024 (04:00 pm) and the patient want to reschedule that appointment on the same day but she want it to be a morning's appointment.  --------------------------------------------------------------------------------------------------------------------------    Relationship to Patient: Self     Call Back Information: OK to leave message on voicemail  Preferred Call Back Number: Phone [phone]

## 2024-11-07 ASSESSMENT — PATIENT HEALTH QUESTIONNAIRE - PHQ9
4. FEELING TIRED OR HAVING LITTLE ENERGY: NOT AT ALL
SUM OF ALL RESPONSES TO PHQ QUESTIONS 1-9: 0
7. TROUBLE CONCENTRATING ON THINGS, SUCH AS READING THE NEWSPAPER OR WATCHING TELEVISION: NOT AT ALL
SUM OF ALL RESPONSES TO PHQ QUESTIONS 1-9: 0
8. MOVING OR SPEAKING SO SLOWLY THAT OTHER PEOPLE COULD HAVE NOTICED. OR THE OPPOSITE, BEING SO FIGETY OR RESTLESS THAT YOU HAVE BEEN MOVING AROUND A LOT MORE THAN USUAL: NOT AT ALL
SUM OF ALL RESPONSES TO PHQ9 QUESTIONS 1 & 2: 0
SUM OF ALL RESPONSES TO PHQ QUESTIONS 1-9: 0
6. FEELING BAD ABOUT YOURSELF - OR THAT YOU ARE A FAILURE OR HAVE LET YOURSELF OR YOUR FAMILY DOWN: NOT AT ALL
SUM OF ALL RESPONSES TO PHQ QUESTIONS 1-9: 0
1. LITTLE INTEREST OR PLEASURE IN DOING THINGS: NOT AT ALL
4. FEELING TIRED OR HAVING LITTLE ENERGY: NOT AT ALL
10. IF YOU CHECKED OFF ANY PROBLEMS, HOW DIFFICULT HAVE THESE PROBLEMS MADE IT FOR YOU TO DO YOUR WORK, TAKE CARE OF THINGS AT HOME, OR GET ALONG WITH OTHER PEOPLE: NOT DIFFICULT AT ALL
10. IF YOU CHECKED OFF ANY PROBLEMS, HOW DIFFICULT HAVE THESE PROBLEMS MADE IT FOR YOU TO DO YOUR WORK, TAKE CARE OF THINGS AT HOME, OR GET ALONG WITH OTHER PEOPLE: NOT DIFFICULT AT ALL
2. FEELING DOWN, DEPRESSED OR HOPELESS: NOT AT ALL
2. FEELING DOWN, DEPRESSED OR HOPELESS: NOT AT ALL
6. FEELING BAD ABOUT YOURSELF - OR THAT YOU ARE A FAILURE OR HAVE LET YOURSELF OR YOUR FAMILY DOWN: NOT AT ALL
5. POOR APPETITE OR OVEREATING: NOT AT ALL
9. THOUGHTS THAT YOU WOULD BE BETTER OFF DEAD, OR OF HURTING YOURSELF: NOT AT ALL
3. TROUBLE FALLING OR STAYING ASLEEP: NOT AT ALL
5. POOR APPETITE OR OVEREATING: NOT AT ALL
3. TROUBLE FALLING OR STAYING ASLEEP: NOT AT ALL
1. LITTLE INTEREST OR PLEASURE IN DOING THINGS: NOT AT ALL
7. TROUBLE CONCENTRATING ON THINGS, SUCH AS READING THE NEWSPAPER OR WATCHING TELEVISION: NOT AT ALL
9. THOUGHTS THAT YOU WOULD BE BETTER OFF DEAD, OR OF HURTING YOURSELF: NOT AT ALL
8. MOVING OR SPEAKING SO SLOWLY THAT OTHER PEOPLE COULD HAVE NOTICED. OR THE OPPOSITE - BEING SO FIDGETY OR RESTLESS THAT YOU HAVE BEEN MOVING AROUND A LOT MORE THAN USUAL: NOT AT ALL
SUM OF ALL RESPONSES TO PHQ QUESTIONS 1-9: 0

## 2024-11-08 ENCOUNTER — OFFICE VISIT (OUTPATIENT)
Dept: PRIMARY CARE CLINIC | Age: 64
End: 2024-11-08

## 2024-11-08 VITALS
RESPIRATION RATE: 18 BRPM | HEART RATE: 83 BPM | DIASTOLIC BLOOD PRESSURE: 80 MMHG | BODY MASS INDEX: 36.6 KG/M2 | WEIGHT: 214.4 LBS | OXYGEN SATURATION: 97 % | HEIGHT: 64 IN | SYSTOLIC BLOOD PRESSURE: 119 MMHG

## 2024-11-08 DIAGNOSIS — Z23 NEED FOR INFLUENZA VACCINATION: ICD-10-CM

## 2024-11-08 DIAGNOSIS — E78.2 MIXED HYPERLIPIDEMIA: ICD-10-CM

## 2024-11-08 DIAGNOSIS — F51.01 PRIMARY INSOMNIA: ICD-10-CM

## 2024-11-08 DIAGNOSIS — E79.0 HYPERURICEMIA: ICD-10-CM

## 2024-11-08 DIAGNOSIS — Z00.00 PREVENTATIVE HEALTH CARE: Primary | ICD-10-CM

## 2024-11-08 DIAGNOSIS — R73.01 IMPAIRED FASTING GLUCOSE: ICD-10-CM

## 2024-11-08 DIAGNOSIS — R73.03 PREDIABETES: ICD-10-CM

## 2024-11-08 DIAGNOSIS — I10 ESSENTIAL HYPERTENSION: ICD-10-CM

## 2024-11-08 DIAGNOSIS — E66.9 OBESITY (BMI 35.0-39.9 WITHOUT COMORBIDITY): ICD-10-CM

## 2024-11-08 RX ORDER — ATORVASTATIN CALCIUM 40 MG/1
40 TABLET, FILM COATED ORAL DAILY
Qty: 90 TABLET | Refills: 1 | Status: SHIPPED | OUTPATIENT
Start: 2024-11-08

## 2024-11-08 RX ORDER — SEMAGLUTIDE 0.5 MG/.5ML
0.5 INJECTION, SOLUTION SUBCUTANEOUS
Qty: 4 ML | Refills: 5 | Status: SHIPPED | OUTPATIENT
Start: 2024-11-08

## 2024-11-08 RX ORDER — AMLODIPINE BESYLATE 10 MG/1
10 TABLET ORAL DAILY
Qty: 90 TABLET | Refills: 1 | Status: SHIPPED | OUTPATIENT
Start: 2024-11-08

## 2024-11-08 RX ORDER — LOSARTAN POTASSIUM 100 MG/1
100 TABLET ORAL DAILY
Qty: 90 TABLET | Refills: 1 | Status: SHIPPED | OUTPATIENT
Start: 2024-11-08

## 2024-11-08 RX ORDER — OMEPRAZOLE 40 MG/1
40 CAPSULE, DELAYED RELEASE ORAL DAILY
Qty: 90 CAPSULE | Refills: 1 | Status: SHIPPED | OUTPATIENT
Start: 2024-11-08

## 2024-11-08 RX ORDER — ZOLPIDEM TARTRATE 10 MG/1
10 TABLET ORAL NIGHTLY PRN
Qty: 30 TABLET | Refills: 0 | Status: SHIPPED | OUTPATIENT
Start: 2024-11-08 | End: 2024-12-08

## 2024-11-08 RX ORDER — METOPROLOL SUCCINATE 50 MG/1
50 TABLET, EXTENDED RELEASE ORAL DAILY
Qty: 90 TABLET | Refills: 1 | Status: SHIPPED | OUTPATIENT
Start: 2024-11-08

## 2024-11-08 SDOH — ECONOMIC STABILITY: FOOD INSECURITY: WITHIN THE PAST 12 MONTHS, THE FOOD YOU BOUGHT JUST DIDN'T LAST AND YOU DIDN'T HAVE MONEY TO GET MORE.: NEVER TRUE

## 2024-11-08 SDOH — ECONOMIC STABILITY: FOOD INSECURITY: WITHIN THE PAST 12 MONTHS, YOU WORRIED THAT YOUR FOOD WOULD RUN OUT BEFORE YOU GOT MONEY TO BUY MORE.: NEVER TRUE

## 2024-11-08 SDOH — ECONOMIC STABILITY: INCOME INSECURITY: HOW HARD IS IT FOR YOU TO PAY FOR THE VERY BASICS LIKE FOOD, HOUSING, MEDICAL CARE, AND HEATING?: NOT HARD AT ALL

## 2024-11-08 ASSESSMENT — ENCOUNTER SYMPTOMS
DIARRHEA: 0
ABDOMINAL PAIN: 0
BLOOD IN STOOL: 0
SHORTNESS OF BREATH: 0
CONSTIPATION: 0

## 2024-11-08 NOTE — PROGRESS NOTES
MG delayed release capsule Take 1 capsule by mouth daily Yes Valeriy Cameron MD   Semaglutide-Weight Management (WEGOVY) 0.5 MG/0.5ML SOAJ SC injection Inject 0.5 mg into the skin every 7 days Yes Valeriy Cameron MD   zolpidem (AMBIEN) 10 MG tablet Take 1 tablet by mouth nightly as needed for Sleep for up to 30 days. Max Daily Amount: 10 mg Yes Valeriy Cameron MD   acetaminophen (AMINOFEN) 325 MG tablet Take 2 tablets by mouth every 6 hours as needed for Pain Yes Guerda Huang, APRN - CNP   MULTIPLE VITAMIN PO Take by mouth daily Yes Ignacio Copeland MD   calcium carbonate (CALCIUM 600) 600 MG TABS tablet Take 1 tablet by mouth 2 times daily Yes Valeriy Cameron MD   Cholecalciferol (VITAMIN D3) 1000 units CAPS Take 1 capsule by mouth daily Yes Valeriy Cameron MD   naproxen (NAPROSYN) 500 MG tablet Take 1 tablet by mouth 2 times daily (with meals) Take with food.  Valeriy Cameron MD        Social History     Tobacco Use    Smoking status: Former     Current packs/day: 0.00     Average packs/day: 0.5 packs/day for 10.0 years (5.0 ttl pk-yrs)     Types: Cigarettes     Start date:      Quit date:      Years since quittin.8    Smokeless tobacco: Never   Substance Use Topics    Alcohol use: No        Vitals:    24 1604   BP: 119/80   Pulse: 83   Resp: 18   SpO2: 97%   Weight: 97.3 kg (214 lb 6.4 oz)   Height: 1.632 m (5' 4.25\")     Estimated body mass index is 36.52 kg/m² as calculated from the following:    Height as of this encounter: 1.632 m (5' 4.25\").    Weight as of this encounter: 97.3 kg (214 lb 6.4 oz).    Physical Exam  Constitutional:       General: She is not in acute distress.     Appearance: She is well-developed.   HENT:      Head: Normocephalic.   Eyes:      Conjunctiva/sclera: Conjunctivae normal.   Neck:      Thyroid: No thyromegaly.   Cardiovascular:      Rate and Rhythm: Normal rate and regular rhythm.      Heart sounds: Normal heart sounds. No murmur

## 2024-11-09 DIAGNOSIS — Z00.00 PREVENTATIVE HEALTH CARE: ICD-10-CM

## 2024-11-09 DIAGNOSIS — R73.01 IMPAIRED FASTING GLUCOSE: ICD-10-CM

## 2024-11-09 LAB
25(OH)D3 SERPL-MCNC: 31.8 NG/ML
ALBUMIN SERPL-MCNC: 4.4 G/DL (ref 3.4–5)
ALBUMIN/GLOB SERPL: 2.2 {RATIO} (ref 1.1–2.2)
ALP SERPL-CCNC: 97 U/L (ref 40–129)
ALT SERPL-CCNC: 20 U/L (ref 10–40)
ANION GAP SERPL CALCULATED.3IONS-SCNC: 18 MMOL/L (ref 3–16)
AST SERPL-CCNC: 20 U/L (ref 15–37)
BASOPHILS # BLD: 0 K/UL (ref 0–0.2)
BASOPHILS NFR BLD: 0.7 %
BILIRUB SERPL-MCNC: 0.3 MG/DL (ref 0–1)
BUN SERPL-MCNC: 11 MG/DL (ref 7–20)
CALCIUM SERPL-MCNC: 9.3 MG/DL (ref 8.3–10.6)
CHLORIDE SERPL-SCNC: 104 MMOL/L (ref 99–110)
CHOLEST SERPL-MCNC: 152 MG/DL (ref 0–199)
CO2 SERPL-SCNC: 26 MMOL/L (ref 21–32)
CREAT SERPL-MCNC: 0.6 MG/DL (ref 0.6–1.2)
DEPRECATED RDW RBC AUTO: 14.1 % (ref 12.4–15.4)
EOSINOPHIL # BLD: 0.3 K/UL (ref 0–0.6)
EOSINOPHIL NFR BLD: 5.6 %
GFR SERPLBLD CREATININE-BSD FMLA CKD-EPI: >90 ML/MIN/{1.73_M2}
GLUCOSE P FAST SERPL-MCNC: 100 MG/DL (ref 70–99)
HCT VFR BLD AUTO: 39.3 % (ref 36–48)
HDLC SERPL-MCNC: 52 MG/DL (ref 40–60)
HGB BLD-MCNC: 13.6 G/DL (ref 12–16)
LDL CHOLESTEROL: 55 MG/DL
LYMPHOCYTES # BLD: 1.8 K/UL (ref 1–5.1)
LYMPHOCYTES NFR BLD: 33.6 %
MCH RBC QN AUTO: 30.6 PG (ref 26–34)
MCHC RBC AUTO-ENTMCNC: 34.6 G/DL (ref 31–36)
MCV RBC AUTO: 88.5 FL (ref 80–100)
MONOCYTES # BLD: 0.4 K/UL (ref 0–1.3)
MONOCYTES NFR BLD: 7.1 %
NEUTROPHILS # BLD: 2.8 K/UL (ref 1.7–7.7)
NEUTROPHILS NFR BLD: 53 %
PLATELET # BLD AUTO: 257 K/UL (ref 135–450)
PMV BLD AUTO: 9.1 FL (ref 5–10.5)
POTASSIUM SERPL-SCNC: 4.1 MMOL/L (ref 3.5–5.1)
PROT SERPL-MCNC: 6.4 G/DL (ref 6.4–8.2)
RBC # BLD AUTO: 4.44 M/UL (ref 4–5.2)
SODIUM SERPL-SCNC: 148 MMOL/L (ref 136–145)
T4 FREE SERPL-MCNC: 1.1 NG/DL (ref 0.9–1.8)
TRIGL SERPL-MCNC: 225 MG/DL (ref 0–150)
TSH SERPL DL<=0.005 MIU/L-ACNC: 2 UIU/ML (ref 0.27–4.2)
URATE SERPL-MCNC: 6.1 MG/DL (ref 2.6–6)
VLDLC SERPL CALC-MCNC: 45 MG/DL
WBC # BLD AUTO: 5.4 K/UL (ref 4–11)

## 2024-11-10 LAB
EST. AVERAGE GLUCOSE BLD GHB EST-MCNC: 119.8 MG/DL
HBA1C MFR BLD: 5.8 %

## 2024-11-15 ENCOUNTER — TELEPHONE (OUTPATIENT)
Dept: ADMINISTRATIVE | Age: 64
End: 2024-11-15

## 2024-11-15 NOTE — TELEPHONE ENCOUNTER
Submitted PA for Semaglutide-Weight Management (WEGOVY) 0.5 MG/0.5ML SOAJ SC injection   Via CMM (Key: [id number]) STATUS: PENDING.    This drug/product is not covered under the pharmacy benefit. Prior Authorization is not available     If this requires a response please respond to the pool ( P MHCX PSC MEDICATION PRE-AUTH).      Thank you please advise patient.

## 2024-12-11 ENCOUNTER — OFFICE VISIT (OUTPATIENT)
Dept: ORTHOPEDIC SURGERY | Age: 64
End: 2024-12-11

## 2024-12-11 VITALS — BODY MASS INDEX: 36.54 KG/M2 | WEIGHT: 214 LBS | RESPIRATION RATE: 16 BRPM | HEIGHT: 64 IN

## 2024-12-11 DIAGNOSIS — M19.031 LOCALIZED PRIMARY OSTEOARTHRITIS OF CARPOMETACARPAL (CMC) JOINT OF RIGHT WRIST: Primary | ICD-10-CM

## 2024-12-11 NOTE — PROGRESS NOTES
Ms. Tom Avalos is a 64 y.o. right handed woman  who is seen today in Hand Surgical Consultation at the request of Valeriy Cameron MD.    She is seen today regarding a 2 month(s) history of right basilar thumb pain without history of previous injury.  She  was seen for this concern by her primary care physician; previous treatment has included conservative measures and Prescription medication.  She  reports moderate Enlargement about the base of the Thumb, Basilar Thumb pain, and Pain with pinch & grasp located about the base of the right thumbs at the level of the CMC Joint, no tenderness of the remaining hand, wrist, or elbow on either side.  She notes today, no neurologic symptoms in the hands. Symptoms show no change over time.      I have today reviewed with Tom Avalos the clinically relevant, past medical history, medications, allergies,  family history, social history, and Review Of Systems & I have documented any details relevant to today's presenting complaints in my history above.  Ms. Tom Avalos's self-reported past medical history, medications, allergies,  family history, social history, and Review Of Systems have been scanned into the chart under the \"Media\" tab.    Physical Exam:  Ms. Tom Avalos's most recent vitals:  Vitals  Respirations: 16  Height: 162.6 cm (5' 4\")  Weight - Scale: 97.1 kg (214 lb)    She is well nourished, oriented to person, place & time.  She demonstrates appropriate mood and affect as well as normal gait and station.    Skin: Normal in appearance, Normal Color, and Free of Lesions Bilateral   Finger range of motion is without significant limitation bilaterally.  Thumb range of motion is  decreased in all spheres at the basilar joint on the Right, greater than Left   Wrist range of motion is without significant limitation bilaterally  There is no evidence of gross joint instability bilaterally.  Sensation is subjectively normal in the Whole

## 2024-12-12 ENCOUNTER — OFFICE VISIT (OUTPATIENT)
Dept: GYNECOLOGY | Age: 64
End: 2024-12-12
Payer: COMMERCIAL

## 2024-12-12 VITALS
HEIGHT: 64 IN | BODY MASS INDEX: 36.37 KG/M2 | DIASTOLIC BLOOD PRESSURE: 88 MMHG | OXYGEN SATURATION: 98 % | WEIGHT: 213 LBS | SYSTOLIC BLOOD PRESSURE: 132 MMHG | HEART RATE: 88 BPM

## 2024-12-12 DIAGNOSIS — Z13.820 OSTEOPOROSIS SCREENING: ICD-10-CM

## 2024-12-12 DIAGNOSIS — Z01.419 WELL WOMAN EXAM WITH ROUTINE GYNECOLOGICAL EXAM: Primary | ICD-10-CM

## 2024-12-12 DIAGNOSIS — Z78.0 MENOPAUSE: ICD-10-CM

## 2024-12-12 PROCEDURE — 99396 PREV VISIT EST AGE 40-64: CPT | Performed by: OBSTETRICS & GYNECOLOGY

## 2024-12-12 PROCEDURE — 3075F SYST BP GE 130 - 139MM HG: CPT | Performed by: OBSTETRICS & GYNECOLOGY

## 2024-12-12 PROCEDURE — 3079F DIAST BP 80-89 MM HG: CPT | Performed by: OBSTETRICS & GYNECOLOGY

## 2024-12-14 ASSESSMENT — ENCOUNTER SYMPTOMS
GASTROINTESTINAL NEGATIVE: 1
EYES NEGATIVE: 1
RESPIRATORY NEGATIVE: 1
ALLERGIC/IMMUNOLOGIC NEGATIVE: 1

## 2024-12-14 NOTE — PROGRESS NOTES
Subjective   Patient ID: Tom Avalos is a 64 y.o. female.    Patient is here for annual. Patient in menopause.     Gynecologic Exam        Review of Systems   Constitutional: Negative.    HENT: Negative.     Eyes: Negative.    Respiratory: Negative.     Cardiovascular: Negative.    Gastrointestinal: Negative.    Endocrine: Negative.    Genitourinary: Negative.    Musculoskeletal: Negative.    Skin: Negative.    Allergic/Immunologic: Negative.    Neurological: Negative.    Hematological: Negative.    Psychiatric/Behavioral: Negative.       Date of Birth 1960  Past Medical History:   Diagnosis Date    Anxiety 2014    Cervical polyp     History of vitamin D deficiency     HTN (hypertension) 2014    Hyperlipidemia 2013    Impaired fasting glucose 2013    OA (osteoarthritis) of hip 2015    s/p  left hip replacement by Dr Hoffman    Obesity 2014    Perimenopausal 2013    Prediabetes     S/P hip replacement 2015     Past Surgical History:   Procedure Laterality Date    CHOLECYSTECTOMY      COLONOSCOPY      COLONOSCOPY N/A 2023    COLORECTAL CANCER SCREENING, NOT HIGH RISK performed by Gonsalo Morrison MD at [facility] ENDOSCOPY    HIP ARTHROPLASTY Left 2015    Left anterior hip replacement    JOINT REPLACEMENT Left     THR    TOTAL HIP ARTHROPLASTY Right 2023    RIGHT ANTERIOR TOTAL HIP REPLACEMENT WITH C-ARM performed by Juve Wang MD at [facility] OR     OB History    Para Term  AB Living   4 0 0   1     SAB IAB Ectopic Molar Multiple Live Births   1         3      # Outcome Date GA Lbr Dylan/2nd Weight Sex Type Anes PTL Lv   4 SAB            3       Vag-Spont      2       Vag-Spont      1       Vag-Spont        Social History     Socioeconomic History    Marital status:      Spouse name: Not on file    Number of children: Not on file    Years of education: Not on file    Highest education level: Not on

## 2025-03-03 ENCOUNTER — HOSPITAL ENCOUNTER (OUTPATIENT)
Dept: WOMENS IMAGING | Age: 65
Discharge: HOME | End: 2025-03-03
Payer: COMMERCIAL

## 2025-03-03 VITALS — WEIGHT: 220 LBS | BODY MASS INDEX: 36.65 KG/M2 | HEIGHT: 65 IN

## 2025-03-03 DIAGNOSIS — Z12.31 ENCOUNTER FOR SCREENING MAMMOGRAM FOR HIGH-RISK PATIENT: ICD-10-CM

## 2025-03-03 PROCEDURE — 77063 BREAST TOMOSYNTHESIS BI: CPT

## 2025-04-26 ENCOUNTER — OFFICE VISIT (OUTPATIENT)
Dept: PRIMARY CARE CLINIC | Age: 65
End: 2025-04-26
Payer: COMMERCIAL

## 2025-04-26 VITALS
WEIGHT: 223 LBS | SYSTOLIC BLOOD PRESSURE: 129 MMHG | DIASTOLIC BLOOD PRESSURE: 87 MMHG | RESPIRATION RATE: 18 BRPM | HEART RATE: 80 BPM | OXYGEN SATURATION: 95 % | BODY MASS INDEX: 37.11 KG/M2

## 2025-04-26 DIAGNOSIS — E66.9 OBESITY (BMI 35.0-39.9 WITHOUT COMORBIDITY): ICD-10-CM

## 2025-04-26 DIAGNOSIS — E78.2 MIXED HYPERLIPIDEMIA: ICD-10-CM

## 2025-04-26 DIAGNOSIS — F41.8 ANXIETY WITH DEPRESSION: ICD-10-CM

## 2025-04-26 DIAGNOSIS — I10 ESSENTIAL HYPERTENSION: Primary | ICD-10-CM

## 2025-04-26 DIAGNOSIS — E79.0 HYPERURICEMIA: ICD-10-CM

## 2025-04-26 PROCEDURE — 3079F DIAST BP 80-89 MM HG: CPT | Performed by: FAMILY MEDICINE

## 2025-04-26 PROCEDURE — 99214 OFFICE O/P EST MOD 30 MIN: CPT | Performed by: FAMILY MEDICINE

## 2025-04-26 PROCEDURE — 3074F SYST BP LT 130 MM HG: CPT | Performed by: FAMILY MEDICINE

## 2025-04-26 RX ORDER — AMLODIPINE BESYLATE 10 MG/1
10 TABLET ORAL DAILY
Qty: 90 TABLET | Refills: 1 | Status: CANCELLED | OUTPATIENT
Start: 2025-04-26

## 2025-04-26 RX ORDER — METOPROLOL SUCCINATE 50 MG/1
50 TABLET, EXTENDED RELEASE ORAL DAILY
Qty: 90 TABLET | Refills: 1 | Status: SHIPPED | OUTPATIENT
Start: 2025-04-26

## 2025-04-26 RX ORDER — LOSARTAN POTASSIUM 100 MG/1
100 TABLET ORAL DAILY
Qty: 90 TABLET | Refills: 1 | Status: SHIPPED | OUTPATIENT
Start: 2025-04-26

## 2025-04-26 RX ORDER — ATORVASTATIN CALCIUM 40 MG/1
40 TABLET, FILM COATED ORAL DAILY
Qty: 90 TABLET | Refills: 1 | Status: SHIPPED | OUTPATIENT
Start: 2025-04-26

## 2025-04-26 RX ORDER — OMEPRAZOLE 40 MG/1
40 CAPSULE, DELAYED RELEASE ORAL DAILY
Qty: 90 CAPSULE | Refills: 1 | Status: SHIPPED | OUTPATIENT
Start: 2025-04-26

## 2025-04-26 RX ORDER — AMLODIPINE BESYLATE 5 MG/1
5 TABLET ORAL DAILY
Qty: 90 TABLET | Refills: 1 | Status: SHIPPED | OUTPATIENT
Start: 2025-04-26

## 2025-04-26 SDOH — ECONOMIC STABILITY: FOOD INSECURITY: WITHIN THE PAST 12 MONTHS, YOU WORRIED THAT YOUR FOOD WOULD RUN OUT BEFORE YOU GOT MONEY TO BUY MORE.: NEVER TRUE

## 2025-04-26 SDOH — ECONOMIC STABILITY: FOOD INSECURITY: WITHIN THE PAST 12 MONTHS, THE FOOD YOU BOUGHT JUST DIDN'T LAST AND YOU DIDN'T HAVE MONEY TO GET MORE.: NEVER TRUE

## 2025-04-26 ASSESSMENT — ENCOUNTER SYMPTOMS
DIARRHEA: 0
ABDOMINAL PAIN: 0
CONSTIPATION: 0
SHORTNESS OF BREATH: 0
BLOOD IN STOOL: 0

## 2025-04-26 ASSESSMENT — PATIENT HEALTH QUESTIONNAIRE - PHQ9
3. TROUBLE FALLING OR STAYING ASLEEP: SEVERAL DAYS
2. FEELING DOWN, DEPRESSED OR HOPELESS: NOT AT ALL
7. TROUBLE CONCENTRATING ON THINGS, SUCH AS READING THE NEWSPAPER OR WATCHING TELEVISION: NOT AT ALL
1. LITTLE INTEREST OR PLEASURE IN DOING THINGS: NOT AT ALL
4. FEELING TIRED OR HAVING LITTLE ENERGY: NOT AT ALL
8. MOVING OR SPEAKING SO SLOWLY THAT OTHER PEOPLE COULD HAVE NOTICED. OR THE OPPOSITE, BEING SO FIGETY OR RESTLESS THAT YOU HAVE BEEN MOVING AROUND A LOT MORE THAN USUAL: NOT AT ALL
SUM OF ALL RESPONSES TO PHQ QUESTIONS 1-9: 2
SUM OF ALL RESPONSES TO PHQ QUESTIONS 1-9: 2
5. POOR APPETITE OR OVEREATING: SEVERAL DAYS
SUM OF ALL RESPONSES TO PHQ QUESTIONS 1-9: 2
9. THOUGHTS THAT YOU WOULD BE BETTER OFF DEAD, OR OF HURTING YOURSELF: NOT AT ALL
SUM OF ALL RESPONSES TO PHQ QUESTIONS 1-9: 2
6. FEELING BAD ABOUT YOURSELF - OR THAT YOU ARE A FAILURE OR HAVE LET YOURSELF OR YOUR FAMILY DOWN: NOT AT ALL
10. IF YOU CHECKED OFF ANY PROBLEMS, HOW DIFFICULT HAVE THESE PROBLEMS MADE IT FOR YOU TO DO YOUR WORK, TAKE CARE OF THINGS AT HOME, OR GET ALONG WITH OTHER PEOPLE: NOT DIFFICULT AT ALL

## 2025-04-26 NOTE — PROGRESS NOTES
2025     Tom Avalos (:  1960) is a 64 y.o. female, here for evaluation of the following medical concerns:    HPI  Patient is 64 years old female medical history significant for hypertension, obesity, impaired fasting glucose, hyperlipidemia and hyperuricemia.  She presented to the office for checkup.  She reported that her blood pressure is controlled with current regimen which includes amlodipine 10 mg daily, losartan and metoprolol.  But complaining of intermittent ankle swelling especially towards the end of the day which might be related to high dose of amlodipine at 10 mg daily..  She is obese and is struggling to lose weight.  She she was prescribed Wegovy but it was not covered by insurance..  She is willing to try something else.  She has hyperlipidemia controlled with Lipitor tolerating statin without side effect    Review of Systems   Constitutional:  Negative for activity change and appetite change.   Eyes:  Negative for visual disturbance.   Respiratory:  Negative for shortness of breath.    Cardiovascular:  Negative for chest pain and leg swelling.   Gastrointestinal:  Negative for abdominal pain, blood in stool, constipation and diarrhea.   Genitourinary:  Negative for difficulty urinating, frequency, hematuria, menstrual problem and urgency.   Neurological:  Negative for dizziness and syncope.   Psychiatric/Behavioral:  Negative for behavioral problems.        Prior to Visit Medications    Medication Sig Taking? Authorizing Provider   atorvastatin (LIPITOR) 40 MG tablet Take 1 tablet by mouth daily Yes Valeriy Cameron MD   losartan (COZAAR) 100 MG tablet Take 1 tablet by mouth daily Yes Valeriy Cameron MD   metoprolol succinate (TOPROL XL) 50 MG extended release tablet Take 1 tablet by mouth daily Yes Valeriy Cameron MD   omeprazole (PRILOSEC) 40 MG delayed release capsule Take 1 capsule by mouth daily Yes Valeriy Cameron MD   amLODIPine (NORVASC) 5 MG tablet Take 1

## 2025-04-28 ENCOUNTER — PATIENT MESSAGE (OUTPATIENT)
Dept: PRIMARY CARE CLINIC | Age: 65
End: 2025-04-28

## 2025-04-28 ENCOUNTER — TELEPHONE (OUTPATIENT)
Dept: PRIMARY CARE CLINIC | Age: 65
End: 2025-04-28

## 2025-04-28 DIAGNOSIS — E66.9 OBESITY, UNSPECIFIED CLASS, UNSPECIFIED OBESITY TYPE, UNSPECIFIED WHETHER SERIOUS COMORBIDITY PRESENT: Primary | ICD-10-CM

## 2025-04-28 NOTE — TELEPHONE ENCOUNTER
Pt called she was at the pharmacy with the prescription she stated pharmacy needs to talk with the provider about the prescription   Pt was not listening as I told her to allow 24 to 48 hours for a call back pt was over talking and wanted to go back and forth with I disconnected the call     Semaglutide,0.25 or 0.5MG/DOS, 2 MG/3ML SOPN       [facility] - [place] [id number] - P [phone] - F [phone] [[id number]]

## 2025-04-28 NOTE — TELEPHONE ENCOUNTER
[facility] called they need the prescription the have the reason why pt needs B-12 and patient pacific       [facility] - [place], [place] - [address] - [phone] - F [phone] [[id number]]

## 2025-04-29 NOTE — TELEPHONE ENCOUNTER
[facility] called back about medication listed in messages below  Pt leaving going out of town

## 2025-04-29 NOTE — TELEPHONE ENCOUNTER
I called [facility] again.   A rep said they could take my verbal, but I needed to speak with a pharmacist.  Transferred.  Spoke with a 2nd person that said they would send me to someone who could help.    On hold for several minutes.  Then phone went blank.   I will try calling them again tomorrow.

## 2025-05-01 NOTE — TELEPHONE ENCOUNTER
Okay to compound    Patient specific.     Clinical reason why needs B12.       [facility]       [phone]

## 2025-05-12 ENCOUNTER — PATIENT MESSAGE (OUTPATIENT)
Dept: PRIMARY CARE CLINIC | Age: 65
End: 2025-05-12

## 2025-05-20 PROBLEM — R53.82 CHRONIC FATIGUE: Status: ACTIVE | Noted: 2025-05-20

## 2025-05-30 NOTE — TELEPHONE ENCOUNTER
Medication:   Requested Prescriptions     Pending Prescriptions Disp Refills    Semaglutide,0.25 or 0.5MG/DOS, 2 MG/3ML SOPN 3 mL 2     Sig: Inject 0.25 mg into the skin every 7 days Dx: Chronic fatigue, low energy        Last Filled:  [unfilled]    Patient Phone Number: [phone] (home) [phone] (work)    Last appt: 4/26/2025   Next appt: Visit date not found    Last OARRS:        No data to display

## 2025-06-23 ENCOUNTER — TELEPHONE (OUTPATIENT)
Dept: OTHER | Facility: CLINIC | Age: 65
End: 2025-06-23

## 2025-06-23 ENCOUNTER — APPOINTMENT (OUTPATIENT)
Dept: GENERAL RADIOLOGY | Age: 65
End: 2025-06-23
Payer: COMMERCIAL

## 2025-06-23 ENCOUNTER — HOSPITAL ENCOUNTER (EMERGENCY)
Age: 65
Discharge: HOME | End: 2025-06-23
Attending: EMERGENCY MEDICINE
Payer: COMMERCIAL

## 2025-06-23 ENCOUNTER — PATIENT MESSAGE (OUTPATIENT)
Dept: PRIMARY CARE CLINIC | Age: 65
End: 2025-06-23

## 2025-06-23 VITALS
WEIGHT: 220 LBS | SYSTOLIC BLOOD PRESSURE: 158 MMHG | OXYGEN SATURATION: 99 % | DIASTOLIC BLOOD PRESSURE: 86 MMHG | BODY MASS INDEX: 36.65 KG/M2 | HEIGHT: 65 IN | TEMPERATURE: 98.3 F | HEART RATE: 78 BPM | RESPIRATION RATE: 14 BRPM

## 2025-06-23 DIAGNOSIS — S09.90XA INJURY OF HEAD, INITIAL ENCOUNTER: ICD-10-CM
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
## 2025-06-23 DIAGNOSIS — M79.605 LEFT LEG PAIN: ICD-10-CM

## 2025-06-23 PROCEDURE — 99283 EMERGENCY DEPT VISIT LOW MDM: CPT

## 2025-06-23 PROCEDURE — 73552 X-RAY EXAM OF FEMUR 2/>: CPT

## 2025-06-23 PROCEDURE — 72170 X-RAY EXAM OF PELVIS: CPT

## 2025-06-23 PROCEDURE — 6370000000 HC RX 637 (ALT 250 FOR IP)

## 2025-06-23 RX ORDER — LIDOCAINE 4 G/G
1 PATCH TOPICAL ONCE
Status: DISCONTINUED | OUTPATIENT
Start: 2025-06-23 | End: 2025-06-23 | Stop reason: HOSPADM

## 2025-06-23 RX ORDER — ONDANSETRON 4 MG/1
4 TABLET, ORALLY DISINTEGRATING ORAL ONCE
Status: COMPLETED | OUTPATIENT
Start: 2025-06-23 | End: 2025-06-23

## 2025-06-23 RX ORDER — LIDOCAINE 4 G/G
1 PATCH TOPICAL DAILY
Status: DISCONTINUED | OUTPATIENT
Start: 2025-06-23 | End: 2025-06-23

## 2025-06-23 RX ORDER — ACETAMINOPHEN 500 MG
1000 TABLET ORAL
Status: COMPLETED | OUTPATIENT
Start: 2025-06-23 | End: 2025-06-23

## 2025-06-23 RX ADMIN — ONDANSETRON 4 MG: 4 TABLET, ORALLY DISINTEGRATING ORAL at 16:33

## 2025-06-23 RX ADMIN — ACETAMINOPHEN 1000 MG: 500 TABLET ORAL at 15:48

## 2025-06-23 RX ADMIN — IBUPROFEN 600 MG: 400 TABLET, FILM COATED ORAL at 15:49

## 2025-06-23 ASSESSMENT — ENCOUNTER SYMPTOMS
NAUSEA: 0
DIARRHEA: 0
VOMITING: 0
SHORTNESS OF BREATH: 0
ABDOMINAL PAIN: 0

## 2025-06-23 ASSESSMENT — PAIN SCALES - GENERAL
PAINLEVEL_OUTOF10: 7
PAINLEVEL_OUTOF10: 5

## 2025-06-23 ASSESSMENT — PAIN DESCRIPTION - FREQUENCY: FREQUENCY: CONTINUOUS

## 2025-06-23 ASSESSMENT — PAIN DESCRIPTION - ORIENTATION
ORIENTATION: LEFT
ORIENTATION: LEFT

## 2025-06-23 ASSESSMENT — PAIN DESCRIPTION - PAIN TYPE: TYPE: ACUTE PAIN

## 2025-06-23 ASSESSMENT — PAIN DESCRIPTION - DESCRIPTORS
DESCRIPTORS: ACHING
DESCRIPTORS: ACHING

## 2025-06-23 ASSESSMENT — PAIN - FUNCTIONAL ASSESSMENT
PAIN_FUNCTIONAL_ASSESSMENT: 0-10
PAIN_FUNCTIONAL_ASSESSMENT: PREVENTS OR INTERFERES SOME ACTIVE ACTIVITIES AND ADLS

## 2025-06-23 ASSESSMENT — PAIN DESCRIPTION - LOCATION
LOCATION: HEAD
LOCATION: HEAD;LEG

## 2025-06-23 NOTE — ED PROVIDER NOTES
THE [facility]  EMERGENCY DEPARTMENT ENCOUNTER          PHYSICIAN ASSISTANT NOTE       Date of evaluation: 6/23/2025    Chief Complaint     Fall (Pt reporting mechanical fall, hit head and L leg. Denies lightheaded, dizzy, loc )      History of Present Illness     Tom Avalos is a 65 y.o. female with a past medical history of bilateral hip replacement and hypertension who presents to the emergency department with concern for left leg pain and headache following mechanical fall.  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  She landed on her left side.  She reports hitting her head but denies use of blood thinner or loss of consciousness.  She denies any chest pain, shortness of breath, or dizziness/lightheadedness before or after the fall.  Denies any changes to her vision.  Denies nausea or vomiting.    ASSESSMENT / PLAN  (MEDICAL DECISION MAKING)     INITIAL VITALS: BP: (!) 178/97, Temp: 98.3 °F (36.8 °C), Pulse: 84, Respirations: 16, SpO2: 100 %    Tom Avalos is a 65 y.o. female with a past medical history of bilateral hip replacement and hypertension who presents to the emergency department with concern for left leg pain and headache following mechanical fall.  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  She landed on her left side.  She reports hitting her head but denies use of blood thinner or loss of consciousness.  She denies any chest pain, shortness of breath, or dizziness/lightheadedness before or after the fall.  Denies any changes to her vision.  Denies nausea or vomiting.    On exam the patient is well-appearing and resting comfortably in bed.  No tenderness to palpation overlying scalp.  Area where the patient

## 2025-06-23 NOTE — ED NOTES
Pt discharged to home. Discharge instructions reviewed with patient. All questions answered, no concerns noted. Pt encouraged to return to emergency department if they have any new or worsening symptoms. Pt alert and oriented, resp even and unlabored, skin natural in color, no signs of acute distress.        Imani Lisa, RN  06/23/25 [id number]

## 2025-06-23 NOTE — ED PROVIDER NOTES
ED Attending Attestation Note     Date of evaluation: 6/23/2025    This patient was seen by the advance practice provider.  I have seen and examined the patient, agree with the workup, evaluation, management and diagnosis. The care plan has been discussed.  My assessment reveals ***.

## 2025-06-23 NOTE — DISCHARGE INSTRUCTIONS
You were seen in the emergency department today following a mechanical fall.  Your x-rays of your left leg returned without evidence of fractures.    Please follow-up with your primary care provider regarding your visit in the emergency department.    You may continue to utilize Tylenol, ice, and heat for management of your symptoms    Return to the emergency department with any new or worsening symptoms including chest pain, shortness of breath, loss of consciousness, persistent headache, vision changes, weakness of any extremities, inability to keep food or drink down, nausea/vomiting, or any symptoms that are concerning to you.

## 2025-06-24 RX ORDER — SEMAGLUTIDE 1.34 MG/ML
1 INJECTION, SOLUTION SUBCUTANEOUS WEEKLY
Qty: 4 ML | Refills: 3 | Status: SHIPPED | OUTPATIENT
Start: 2025-06-24

## 2025-06-27 ENCOUNTER — TELEPHONE (OUTPATIENT)
Dept: ADMINISTRATIVE | Age: 65
End: 2025-06-27

## 2025-06-27 NOTE — TELEPHONE ENCOUNTER
Submitted PA for Ozempic (1 MG/DOSE) 4MG/3ML pen-injectors [facility] [id number] STATUS: PENDING.    Follow up done daily; if no decision with in three days we will refax.  If another three days goes by with no decision will call the insurance for status.

## 2025-06-30 NOTE — TELEPHONE ENCOUNTER
The medication was DENIED; DENIAL letter is uploaded to MEDIA.    Generic Denial: Drug is not covered for off label usage.  This drug is FDA approved for :      Note : Our guideline named GLP-1 AGONIST (Ozempic ) requires the following rule(s) be met for approval:  A. You have type 2 diabetes (a disorder with high blood sugar). B. Your diagnosis of type 2 diabetes is confirmed by medical records OR chart notes      If you want an APPEAL; please note in this encounter what new information you would like to APPEAL with.  Once complete route back to PA POOL.    If this requires a response please respond to the pool ( P MHCX PSC MEDICATION PRE-AUTH).      Thank you please advise patient.

## 2025-07-22 ENCOUNTER — PATIENT MESSAGE (OUTPATIENT)
Dept: PRIMARY CARE CLINIC | Age: 65
End: 2025-07-22